# Patient Record
Sex: FEMALE | Race: WHITE
[De-identification: names, ages, dates, MRNs, and addresses within clinical notes are randomized per-mention and may not be internally consistent; named-entity substitution may affect disease eponyms.]

---

## 2017-03-20 ENCOUNTER — HOSPITAL ENCOUNTER (OUTPATIENT)
Dept: HOSPITAL 24 - RAD | Age: 67
End: 2017-03-20
Attending: NURSE PRACTITIONER
Payer: COMMERCIAL

## 2017-03-20 DIAGNOSIS — E78.4: ICD-10-CM

## 2017-03-20 DIAGNOSIS — Z00.00: Primary | ICD-10-CM

## 2017-03-20 DIAGNOSIS — I10: ICD-10-CM

## 2017-03-20 DIAGNOSIS — Z12.31: ICD-10-CM

## 2017-03-20 DIAGNOSIS — E11.69: ICD-10-CM

## 2017-03-20 DIAGNOSIS — K21.9: ICD-10-CM

## 2017-03-20 LAB
ALBUMIN SERPL BCP-MCNC: 3.7 G/DL (ref 3.4–5)
ALP SERPL-CCNC: 89 UNITS/L (ref 46–116)
ALT SERPL W P-5'-P-CCNC: 54 UNITS/L (ref 12–78)
AST SERPL W P-5'-P-CCNC: 37 UNITS/L (ref 15–37)
BASOPHILS # BLD AUTO: 0 X10^3/UL (ref 0–0.1)
BASOPHILS NFR BLD AUTO: 0.4 % (ref 0.2–1)
BUN SERPL-MCNC: 19 MG/DL (ref 7–18)
CALCIUM ALBUM COR SERPL-SCNC: (no result) MG/DL (ref 8.5–10.1)
CALCIUM ALBUM COR SERPL-SCNC: 143 MMOL/L (ref 136–145)
CALCIUM SERPL-MCNC: 9.8 MG/DL (ref 8.5–10.1)
CHLORIDE SERPL-SCNC: 106 MMOL/L (ref 98–107)
CHOLEST SERPL-MCNC: 198 MG/DL (ref 0–200)
CHOLEST/HDLC SERPL: 3.6 {RATIO} (ref 0–5)
CO2 SERPL-SCNC: 26.8 MMOL/L (ref 21–32)
CREAT SERPL-MCNC: 0.91 MG/DL (ref 0.55–1.02)
CREAT UR-MCNC: 108.99 MG/DL (ref 29–226)
EGFR  BLACK RACES: > 60 (ref 60–?)
EOSINOPHIL # BLD AUTO: 0.2 X10^3/UL (ref 0–0.2)
EOSINOPHIL NFR BLD AUTO: 2.9 % (ref 0.9–2.9)
ERYTHROCYTE [DISTWIDTH] IN BLOOD BY AUTOMATED COUNT: 12.7 % (ref 11.6–16.5)
GLUCOSE BLD-SCNC: 139 MG/DL (ref 65–99)
HBA1C MFR BLD: 6.4 % (ref 4.5–6.2)
HCT VFR BLD AUTO: 37.7 % (ref 36–47)
HDLC SERPL-MCNC: 55 MG/DL (ref 40–60)
HGB BLD-MCNC: 12.7 G/DL (ref 12–16)
LYMPHOCYTES # BLD AUTO: 1.8 X10^3/UL (ref 1.3–2.9)
LYMPHOCYTES NFR BLD AUTO: 21.4 % (ref 21–51)
MCH RBC QN AUTO: 30 PG (ref 27–34)
MCHC RBC AUTO-ENTMCNC: 33.9 G/DL (ref 33–35)
MCV RBC AUTO: 88.6 FL (ref 80–100)
MICROALBUM/CREATININE RATIO,UR: 8 MG/G CRE (ref 0–29)
MICROALBUMIN,URINE: 9.3 MG/L
MONOCYTES # BLD AUTO: 0.7 X10^3/UL (ref 0.3–0.8)
MONOCYTES NFR BLD AUTO: 8.3 % (ref 0–13)
NEUTROPHILS # BLD AUTO: 5.6 X10^3/UL (ref 2.2–4.8)
NEUTROPHILS NFR BLD AUTO: 67 % (ref 42–75)
PLATELET # BLD: 226 X10^3/UL (ref 150–450)
PMV BLD AUTO: 7.1 FL (ref 7.4–11)
PROT SERPL-MCNC: 7.5 G/DL (ref 6.4–8.2)
RBC # BLD AUTO: 4.25 X10^6/UL (ref 3.5–5.4)
SODIUM SERPL-SCNC: 142 MMOL/L (ref 136–145)
T4 FREE SERPL-MCNC: 1.09 NG/DL (ref 0.76–1.46)
T4 SERPL-MCNC: 13.4 UG/DL (ref 4.7–13.3)
TRIGL SERPL-MCNC: 108 MG/DL (ref 0–150)
TSH SERPL DL<=0.05 MIU/L-ACNC: 2.54 UIU/ML (ref 0.36–3.74)
WBC NRBC COR # BLD AUTO: 8.4 X10^3/UL (ref 3.6–10)

## 2017-03-20 PROCEDURE — 80061 LIPID PANEL: CPT

## 2017-03-20 PROCEDURE — 84436 ASSAY OF TOTAL THYROXINE: CPT

## 2017-03-20 PROCEDURE — 84439 ASSAY OF FREE THYROXINE: CPT

## 2017-03-20 PROCEDURE — 36415 COLL VENOUS BLD VENIPUNCTURE: CPT

## 2017-03-20 PROCEDURE — 77067 SCR MAMMO BI INCL CAD: CPT

## 2017-03-20 PROCEDURE — 80053 COMPREHEN METABOLIC PANEL: CPT

## 2017-03-20 PROCEDURE — 83036 HEMOGLOBIN GLYCOSYLATED A1C: CPT

## 2017-03-20 PROCEDURE — 82043 UR ALBUMIN QUANTITATIVE: CPT

## 2017-03-20 PROCEDURE — 84443 ASSAY THYROID STIM HORMONE: CPT

## 2017-03-20 PROCEDURE — 85025 COMPLETE CBC W/AUTO DIFF WBC: CPT

## 2017-03-20 NOTE — MG
HISTORY:  SCREENING 



Comparison: Multiple priors dating back to November 8, 2012



FINDINGS: 



Bilateral CC and MLO projections of the right and left breast were obtained.  Scattered fibroglandul
ar tissue is seen to be present without significant interval change.  No suspicious architectural di
stortion, mass or clustered microcalcifications can be observed to suggest malignancy.  No skin thic
kening or nipple retraction is appreciated.   No pathological lymphadenopathy can be identified. Scott
ign-appearing calcifications are noted within the right and left breast. A biopsy clip is noted on t
he left. There is a stable benign appear 9 o'clock left breast nodule.

 

IMPRESSION:  NO RADIOGRAPHIC EVIDENCE OF MALIGNANCY.  

 

ACR CATEGORY 2 - benign findings.

 

FOLLOW-UP EXAM 1 YEAR. 



Diagnostic CAD was utilized and reviewed.



* 0 (ZERO) - ASSESSMENT INCOMPLETE; ADDITIONAL IMAGING IS NEEDED. 

* 1/1 (ONE) - NEGATIVE. 

* 2/II (TWO) - BENIGN FINDINGS.

 * 3/III (THREE) - PROBABLY BENIGN FINDING; SHORT INTERVAL FOLLOW-UP

SUGGESTED.

 * 4/IV (FOUR) - SUSPICIOUS ABNORMALITY; BIOPSY SHOULD BE CONSIDERED.

 * 5/V (FIVE) - HIGHLY SUSPICIOUS OF MALIGNANCY; BIOPSY SHOULD BE PERFORMED.

 

A NEGATIVE X-RAY REPORT SHOULD NOT DELAY BIOPSY IF A DOMINANT OR

CLINICALLY SUSPICIOUS MASS IS PRESENT; 4 TO 8 PERCENT OF CANCERS ARE NOT IDENTIFIED BY X-RAY.  A NEG
ATIVE REPORT MAY REINFORCE THE CLINICAL IMPRESSION.  ADENOSIS AND DENSE BREASTS MAY OBSCURE AN UNDER
LYING NEOPLASM.





Reported By:Electronically Signed by ADRIANA STEPHENSON MD at 3/20/2017 11:58:21 AM

## 2017-08-23 ENCOUNTER — HOSPITAL ENCOUNTER (OUTPATIENT)
Dept: HOSPITAL 24 - LAB | Age: 67
End: 2017-08-23
Attending: NURSE PRACTITIONER
Payer: COMMERCIAL

## 2017-08-23 DIAGNOSIS — E11.9: ICD-10-CM

## 2017-08-23 DIAGNOSIS — E78.4: Primary | ICD-10-CM

## 2017-08-23 DIAGNOSIS — I10: ICD-10-CM

## 2017-08-23 LAB
ALBUMIN SERPL BCP-MCNC: 3.4 G/DL (ref 3.4–5)
ALP SERPL-CCNC: 91 UNITS/L (ref 46–116)
ALT SERPL W P-5'-P-CCNC: 59 UNITS/L (ref 12–78)
AST SERPL W P-5'-P-CCNC: 37 UNITS/L (ref 15–37)
BASOPHILS # BLD AUTO: 0 X10^3/UL (ref 0–0.1)
BASOPHILS NFR BLD AUTO: 0.4 % (ref 0.2–1)
BUN SERPL-MCNC: 25 MG/DL (ref 7–18)
CALCIUM ALBUM COR SERPL-SCNC: (no result) MG/DL (ref 8.5–10.1)
CALCIUM ALBUM COR SERPL-SCNC: 140 MMOL/L (ref 136–145)
CALCIUM SERPL-MCNC: 9.3 MG/DL (ref 8.5–10.1)
CHLORIDE SERPL-SCNC: 106 MMOL/L (ref 98–107)
CHOLEST SERPL-MCNC: 177 MG/DL (ref 0–200)
CHOLEST/HDLC SERPL: 3 {RATIO} (ref 0–5)
CO2 SERPL-SCNC: 28 MMOL/L (ref 21–32)
CREAT SERPL-MCNC: 0.94 MG/DL (ref 0.55–1.02)
CREAT UR-MCNC: 104.78 MG/DL (ref 29–226)
EGFR  BLACK RACES: > 60 (ref 60–?)
EOSINOPHIL # BLD AUTO: 0.3 X10^3/UL (ref 0–0.2)
EOSINOPHIL NFR BLD AUTO: 2.8 % (ref 0.9–2.9)
ERYTHROCYTE [DISTWIDTH] IN BLOOD BY AUTOMATED COUNT: 12.7 % (ref 11.6–16.5)
GLUCOSE BLD-SCNC: 124 MG/DL (ref 65–99)
HBA1C MFR BLD: 7 % (ref 4.5–6.2)
HCT VFR BLD AUTO: 37.9 % (ref 36–47)
HDLC SERPL-MCNC: 60 MG/DL (ref 40–60)
HGB BLD-MCNC: 12.7 G/DL (ref 12–16)
LYMPHOCYTES # BLD AUTO: 2.1 X10^3/UL (ref 1.3–2.9)
LYMPHOCYTES NFR BLD AUTO: 21.5 % (ref 21–51)
MCH RBC QN AUTO: 30.1 PG (ref 27–34)
MCHC RBC AUTO-ENTMCNC: 33.5 G/DL (ref 33–35)
MCV RBC AUTO: 89.8 FL (ref 80–100)
MICROALBUM/CREATININE RATIO,UR: 7 MG/G CRE (ref 0–29)
MICROALBUMIN,URINE: 7.4 MG/L
MONOCYTES # BLD AUTO: 0.8 X10^3/UL (ref 0.3–0.8)
MONOCYTES NFR BLD AUTO: 8.4 % (ref 0–13)
NEUTROPHILS # BLD AUTO: 6.6 X10^3/UL (ref 2.2–4.8)
NEUTROPHILS NFR BLD AUTO: 66.9 % (ref 42–75)
PLATELET # BLD: 212 X10^3/UL (ref 150–450)
PMV BLD AUTO: 7.5 FL (ref 7.4–11)
PROT SERPL-MCNC: 7 G/DL (ref 6.4–8.2)
RBC # BLD AUTO: 4.22 X10^6/UL (ref 3.5–5.4)
SODIUM SERPL-SCNC: 139 MMOL/L (ref 136–145)
TRIGL SERPL-MCNC: 65 MG/DL (ref 0–150)
WBC NRBC COR # BLD AUTO: 9.9 X10^3/UL (ref 3.6–10)

## 2017-08-23 PROCEDURE — 36415 COLL VENOUS BLD VENIPUNCTURE: CPT

## 2017-08-23 PROCEDURE — 85025 COMPLETE CBC W/AUTO DIFF WBC: CPT

## 2017-08-23 PROCEDURE — 83036 HEMOGLOBIN GLYCOSYLATED A1C: CPT

## 2017-08-23 PROCEDURE — 82043 UR ALBUMIN QUANTITATIVE: CPT

## 2017-08-23 PROCEDURE — 80061 LIPID PANEL: CPT

## 2017-08-23 PROCEDURE — 80053 COMPREHEN METABOLIC PANEL: CPT

## 2018-01-01 ENCOUNTER — HOSPITAL ENCOUNTER (OUTPATIENT)
Dept: HOSPITAL 24 - LAB | Age: 68
End: 2018-01-01
Attending: NURSE PRACTITIONER
Payer: COMMERCIAL

## 2018-01-01 DIAGNOSIS — E11.9: ICD-10-CM

## 2018-01-01 DIAGNOSIS — I10: Primary | ICD-10-CM

## 2018-01-01 DIAGNOSIS — E78.4: ICD-10-CM

## 2018-01-01 LAB
ALBUMIN SERPL BCP-MCNC: 3.5 G/DL (ref 3.4–5)
ALP SERPL-CCNC: 98 UNITS/L (ref 46–116)
ALT SERPL W P-5'-P-CCNC: 52 UNITS/L (ref 12–78)
AST SERPL W P-5'-P-CCNC: 43 UNITS/L (ref 15–37)
BASOPHILS # BLD AUTO: 0 X10^3/UL (ref 0–0.1)
BASOPHILS NFR BLD AUTO: 0.2 % (ref 0.2–1)
BUN SERPL-MCNC: 24 MG/DL (ref 7–18)
CALCIUM ALBUM COR SERPL-SCNC: (no result) MG/DL (ref 8.5–10.1)
CALCIUM ALBUM COR SERPL-SCNC: 140 MMOL/L (ref 136–145)
CALCIUM SERPL-MCNC: 9.8 MG/DL (ref 8.5–10.1)
CHLORIDE SERPL-SCNC: 104 MMOL/L (ref 98–107)
CHOLEST SERPL-MCNC: 176 MG/DL (ref 0–200)
CHOLEST/HDLC SERPL: 3.3 {RATIO} (ref 0–5)
CO2 SERPL-SCNC: 24.1 MMOL/L (ref 21–32)
CREAT SERPL-MCNC: 0.96 MG/DL (ref 0.55–1.02)
CREAT UR-MCNC: 124.82 MG/DL (ref 29–226)
EGFR  BLACK RACES: > 60 (ref 60–?)
EOSINOPHIL # BLD AUTO: 0.3 X10^3/UL (ref 0–0.2)
EOSINOPHIL NFR BLD AUTO: 3 % (ref 0.9–2.9)
ERYTHROCYTE [DISTWIDTH] IN BLOOD BY AUTOMATED COUNT: 12.6 % (ref 11.6–16.5)
HBA1C MFR BLD: 6.7 % (ref 4.5–6.2)
HCT VFR BLD AUTO: 39.9 % (ref 36–47)
HDLC SERPL-MCNC: 54 MG/DL (ref 40–60)
HGB BLD-MCNC: 13.5 G/DL (ref 12–16)
LYMPHOCYTES # BLD AUTO: 2.1 X10^3/UL (ref 1.3–2.9)
LYMPHOCYTES NFR BLD AUTO: 21.2 % (ref 21–51)
MCH RBC QN AUTO: 30.1 PG (ref 27–34)
MCHC RBC AUTO-ENTMCNC: 33.7 G/DL (ref 33–35)
MCV RBC AUTO: 89.2 FL (ref 80–100)
MICROALBUM/CREATININE RATIO,UR: 7 MG/G CRE (ref 0–29)
MICROALBUMIN,URINE: 9.9 MG/L
MONOCYTES # BLD AUTO: 0.9 X10^3/UL (ref 0.3–0.8)
MONOCYTES NFR BLD AUTO: 9.1 % (ref 0–13)
NEUTROPHILS # BLD AUTO: 6.5 X10^3/UL (ref 2.2–4.8)
NEUTROPHILS NFR BLD AUTO: 66.5 % (ref 42–75)
PLATELET # BLD: 256 X10^3/UL (ref 150–450)
PMV BLD AUTO: 7.4 FL (ref 7.4–11)
PROT SERPL-MCNC: 7.5 G/DL (ref 6.4–8.2)
RBC # BLD AUTO: 4.47 X10^6/UL (ref 3.5–5.4)
SODIUM SERPL-SCNC: 139 MMOL/L (ref 136–145)
TRIGL SERPL-MCNC: 129 MG/DL (ref 0–150)
WBC NRBC COR # BLD AUTO: 9.7 X10^3/UL (ref 3.6–10)

## 2018-01-01 PROCEDURE — 85025 COMPLETE CBC W/AUTO DIFF WBC: CPT

## 2018-01-01 PROCEDURE — 36415 COLL VENOUS BLD VENIPUNCTURE: CPT

## 2018-01-01 PROCEDURE — 82043 UR ALBUMIN QUANTITATIVE: CPT

## 2018-01-01 PROCEDURE — 83036 HEMOGLOBIN GLYCOSYLATED A1C: CPT

## 2018-01-01 PROCEDURE — 80061 LIPID PANEL: CPT

## 2018-01-01 PROCEDURE — 80053 COMPREHEN METABOLIC PANEL: CPT

## 2018-05-10 ENCOUNTER — HOSPITAL ENCOUNTER (OUTPATIENT)
Dept: HOSPITAL 24 - LAB | Age: 68
End: 2018-05-10
Attending: NURSE PRACTITIONER
Payer: COMMERCIAL

## 2018-05-10 DIAGNOSIS — E11.9: ICD-10-CM

## 2018-05-10 DIAGNOSIS — I10: Primary | ICD-10-CM

## 2018-05-10 LAB
ALBUMIN SERPL BCP-MCNC: 3.5 G/DL (ref 3.4–5)
ALP SERPL-CCNC: 107 UNITS/L (ref 46–116)
ALT SERPL W P-5'-P-CCNC: 60 UNITS/L (ref 12–78)
AST SERPL W P-5'-P-CCNC: 52 UNITS/L (ref 15–37)
BASOPHILS # BLD AUTO: 0 X10^3/UL (ref 0–0.1)
BASOPHILS NFR BLD AUTO: 0.5 % (ref 0.2–1)
BUN SERPL-MCNC: 19 MG/DL (ref 7–18)
CALCIUM ALBUM COR SERPL-SCNC: (no result) MG/DL (ref 8.5–10.1)
CALCIUM ALBUM COR SERPL-SCNC: 138 MMOL/L (ref 136–145)
CALCIUM SERPL-MCNC: 9.7 MG/DL (ref 8.5–10.1)
CHLORIDE SERPL-SCNC: 104 MMOL/L (ref 98–107)
CO2 SERPL-SCNC: 25.2 MMOL/L (ref 21–32)
CREAT SERPL-MCNC: 0.95 MG/DL (ref 0.55–1.02)
CREAT UR-MCNC: 128.99 MG/DL (ref 29–226)
EGFR  BLACK RACES: > 60 (ref 60–?)
EOSINOPHIL # BLD AUTO: 0.2 X10^3/UL (ref 0–0.2)
EOSINOPHIL NFR BLD AUTO: 2 % (ref 0.9–2.9)
ERYTHROCYTE [DISTWIDTH] IN BLOOD BY AUTOMATED COUNT: 12.7 % (ref 11.6–16.5)
HCT VFR BLD AUTO: 39.4 % (ref 36–47)
HGB BLD-MCNC: 13.3 G/DL (ref 12–16)
LYMPHOCYTES # BLD AUTO: 2.2 X10^3/UL (ref 1.3–2.9)
LYMPHOCYTES NFR BLD AUTO: 21.3 % (ref 21–51)
MCH RBC QN AUTO: 29.8 PG (ref 27–34)
MCHC RBC AUTO-ENTMCNC: 33.7 G/DL (ref 33–35)
MCV RBC AUTO: 88.6 FL (ref 80–100)
MICROALBUM/CREATININE RATIO,UR: 5 MG/G CRE (ref 0–29)
MICROALBUMIN,URINE: 7 MG/L
MONOCYTES # BLD AUTO: 0.9 X10^3/UL (ref 0.3–0.8)
MONOCYTES NFR BLD AUTO: 8.7 % (ref 0–13)
NEUTROPHILS # BLD AUTO: 7.1 X10^3/UL (ref 2.2–4.8)
NEUTROPHILS NFR BLD AUTO: 67.5 % (ref 42–75)
PLATELET # BLD: 246 X10^3/UL (ref 150–450)
PMV BLD AUTO: 7.8 FL (ref 7.4–11)
PROT SERPL-MCNC: 7.5 G/DL (ref 6.4–8.2)
RBC # BLD AUTO: 4.45 X10^6/UL (ref 3.5–5.4)
SODIUM SERPL-SCNC: 137 MMOL/L (ref 136–145)
WBC NRBC COR # BLD AUTO: 10.5 X10^3/UL (ref 3.6–10)

## 2018-05-10 PROCEDURE — 36415 COLL VENOUS BLD VENIPUNCTURE: CPT

## 2018-05-10 PROCEDURE — 85025 COMPLETE CBC W/AUTO DIFF WBC: CPT

## 2018-05-10 PROCEDURE — 80053 COMPREHEN METABOLIC PANEL: CPT

## 2018-05-10 PROCEDURE — 82043 UR ALBUMIN QUANTITATIVE: CPT
